# Patient Record
Sex: FEMALE | Race: WHITE | NOT HISPANIC OR LATINO | Employment: FULL TIME | URBAN - NONMETROPOLITAN AREA
[De-identification: names, ages, dates, MRNs, and addresses within clinical notes are randomized per-mention and may not be internally consistent; named-entity substitution may affect disease eponyms.]

---

## 2020-06-08 ENCOUNTER — TRANSCRIBE ORDERS (OUTPATIENT)
Dept: ADMINISTRATIVE | Facility: HOSPITAL | Age: 40
End: 2020-06-08

## 2020-06-08 DIAGNOSIS — Z01.818 PRE-OPERATIVE CLEARANCE: Primary | ICD-10-CM

## 2020-06-09 ENCOUNTER — LAB (OUTPATIENT)
Dept: LAB | Facility: HOSPITAL | Age: 40
End: 2020-06-09

## 2020-06-09 DIAGNOSIS — Z01.818 PRE-OPERATIVE CLEARANCE: ICD-10-CM

## 2020-06-09 LAB
REF LAB TEST METHOD: NORMAL
SARS-COV-2 RNA RESP QL NAA+PROBE: NOT DETECTED

## 2020-06-09 PROCEDURE — U0002 COVID-19 LAB TEST NON-CDC: HCPCS | Performed by: SURGERY

## 2020-06-09 PROCEDURE — C9803 HOPD COVID-19 SPEC COLLECT: HCPCS

## 2020-06-09 PROCEDURE — U0004 COV-19 TEST NON-CDC HGH THRU: HCPCS | Performed by: SURGERY

## 2020-09-29 ENCOUNTER — TRANSCRIBE ORDERS (OUTPATIENT)
Dept: ADMINISTRATIVE | Facility: HOSPITAL | Age: 40
End: 2020-09-29

## 2020-09-29 DIAGNOSIS — Z11.59 SCREENING EXAMINATION FOR POLIOMYELITIS: Primary | ICD-10-CM

## 2021-01-29 ENCOUNTER — HOSPITAL ENCOUNTER (OUTPATIENT)
Dept: HOSPITAL 79 - CATH | Age: 41
End: 2021-01-29
Attending: INTERNAL MEDICINE
Payer: COMMERCIAL

## 2021-01-29 DIAGNOSIS — R00.2: ICD-10-CM

## 2021-01-29 DIAGNOSIS — R11.2: ICD-10-CM

## 2021-01-29 DIAGNOSIS — R55: Primary | ICD-10-CM

## 2021-01-29 DIAGNOSIS — R51.9: ICD-10-CM

## 2021-01-29 DIAGNOSIS — R00.0: ICD-10-CM

## 2021-01-29 LAB — BUN/CREATININE RATIO: 14 (ref 0–10)

## 2021-02-09 ENCOUNTER — HOSPITAL ENCOUNTER (OUTPATIENT)
Dept: HOSPITAL 79 - HEART 5 | Age: 41
End: 2021-02-09
Attending: INTERNAL MEDICINE
Payer: COMMERCIAL

## 2021-02-09 DIAGNOSIS — R00.2: ICD-10-CM

## 2021-02-09 DIAGNOSIS — Z13.9: Primary | ICD-10-CM

## 2021-02-09 DIAGNOSIS — R55: ICD-10-CM

## 2021-02-09 DIAGNOSIS — R00.0: ICD-10-CM

## 2022-09-06 ENCOUNTER — HOSPITAL ENCOUNTER (OUTPATIENT)
Dept: HOSPITAL 79 - KOH-I | Age: 42
End: 2022-09-06
Payer: COMMERCIAL

## 2022-09-06 DIAGNOSIS — R20.2: Primary | ICD-10-CM

## 2022-09-06 DIAGNOSIS — J32.0: ICD-10-CM

## 2022-09-06 DIAGNOSIS — R51.9: ICD-10-CM

## 2025-06-25 ENCOUNTER — TRANSCRIBE ORDERS (OUTPATIENT)
Dept: ADMINISTRATIVE | Facility: HOSPITAL | Age: 45
End: 2025-06-25
Payer: COMMERCIAL

## 2025-06-25 DIAGNOSIS — Z12.31 SCREENING MAMMOGRAM FOR BREAST CANCER: Primary | ICD-10-CM

## 2025-06-30 ENCOUNTER — HOSPITAL ENCOUNTER (OUTPATIENT)
Facility: HOSPITAL | Age: 45
Discharge: HOME | End: 2025-06-30
Admitting: NURSE PRACTITIONER
Payer: COMMERCIAL

## 2025-06-30 DIAGNOSIS — Z12.31 SCREENING MAMMOGRAM FOR BREAST CANCER: ICD-10-CM

## 2025-06-30 PROCEDURE — 77063 BREAST TOMOSYNTHESIS BI: CPT

## 2025-06-30 PROCEDURE — 77067 SCR MAMMO BI INCL CAD: CPT

## 2025-07-24 ENCOUNTER — PRE-ADMISSION TESTING (OUTPATIENT)
Dept: PREADMISSION TESTING | Facility: HOSPITAL | Age: 45
End: 2025-07-24

## 2025-07-24 VITALS — BODY MASS INDEX: 28.64 KG/M2 | WEIGHT: 167.77 LBS | HEIGHT: 64 IN

## 2025-07-24 LAB
ANION GAP SERPL CALCULATED.3IONS-SCNC: 9.2 MMOL/L (ref 5–15)
BUN SERPL-MCNC: 6.4 MG/DL (ref 6–20)
BUN/CREAT SERPL: 9.3 (ref 7–25)
CALCIUM SPEC-SCNC: 9.4 MG/DL (ref 8.6–10.5)
CHLORIDE SERPL-SCNC: 105 MMOL/L (ref 98–107)
CO2 SERPL-SCNC: 24.8 MMOL/L (ref 22–29)
CREAT SERPL-MCNC: 0.69 MG/DL (ref 0.57–1)
DEPRECATED RDW RBC AUTO: 38.5 FL (ref 37–54)
EGFRCR SERPLBLD CKD-EPI 2021: 110.6 ML/MIN/1.73
ERYTHROCYTE [DISTWIDTH] IN BLOOD BY AUTOMATED COUNT: 11.5 % (ref 12.3–15.4)
GLUCOSE SERPL-MCNC: 99 MG/DL (ref 65–99)
HBA1C MFR BLD: 4.83 % (ref 4.8–5.6)
HCT VFR BLD AUTO: 41.2 % (ref 34–46.6)
HGB BLD-MCNC: 13.9 G/DL (ref 12–15.9)
MCH RBC QN AUTO: 30.7 PG (ref 26.6–33)
MCHC RBC AUTO-ENTMCNC: 33.7 G/DL (ref 31.5–35.7)
MCV RBC AUTO: 90.9 FL (ref 79–97)
PLATELET # BLD AUTO: 198 10*3/MM3 (ref 140–450)
PMV BLD AUTO: 10.3 FL (ref 6–12)
POTASSIUM SERPL-SCNC: 4.5 MMOL/L (ref 3.5–5.2)
RBC # BLD AUTO: 4.53 10*6/MM3 (ref 3.77–5.28)
SODIUM SERPL-SCNC: 139 MMOL/L (ref 136–145)
WBC NRBC COR # BLD AUTO: 7.26 10*3/MM3 (ref 3.4–10.8)

## 2025-07-24 PROCEDURE — 80048 BASIC METABOLIC PNL TOTAL CA: CPT

## 2025-07-24 PROCEDURE — 36415 COLL VENOUS BLD VENIPUNCTURE: CPT

## 2025-07-24 PROCEDURE — 85027 COMPLETE CBC AUTOMATED: CPT

## 2025-07-24 PROCEDURE — 83036 HEMOGLOBIN GLYCOSYLATED A1C: CPT

## 2025-07-24 NOTE — PAT
Patient viewed general PAT education video as instructed in their preoperative information received from their surgeon.  Patient stated the general PAT education video was viewed in its entirety and survey completed.  Copies of PAT general education handouts (Incentive Spirometry, Meds to Beds Program, Patient Belongings, Pre-op skin preparation instructions, Blood Glucose testing, Visitor policy, Surgery FAQ, Code H) distributed to patient if not printed. Education related to the PAT pass and skin preparation for surgery (if applicable) completed in PAT as a reinforcement to PAT education video. Patient instructed to return PAT pass provided today as well as completed skin preparation sheet (if applicable) on the day of procedure.     Additionally if patient had not viewed video yet but intended to view it at home or in our waiting area, then referred them to the handout with QR code/link provided during PAT visit.  Encouraged patient/family to read PAT general education handouts thoroughly and notify PAT staff with any questions or concerns. Patient verbalized understanding of all information and priority content.    An arrival time for procedure was not provided during PAT visit. If patient had any questions or concerns about their arrival time, they were instructed to contact their surgeon/physician.  Additionally, if the patient referred to an arrival time that was acquired from their my chart account, patient was encouraged to verify that time with their surgeon/physician. Arrival times are NOT provided in Pre Admission Testing Department.    Patient denies any current skin issues.     Post-Surgery Information Instruction Sheet given to patient during Pre-Admission Testing Visit with verbal instructions to patient to return with PAT PASS on the day of surgery. Additionally, encouraged patient to review the information provided.    Patient to apply Chlorhexadine wipes  to surgical area (as instructed) the night  before procedure and the AM of procedure. Wipes provided.    Patient instructed to drink 20 ounces of Gatorade or Gatorlyte (if diabetic) and it needs to be completed 1 hour (for Main OR patients) or 2 hours (scheduled  section & [facility] patients) before given arrival time for procedure (NO RED Gatorade and NO Gatorade Zero).    Patient verbalized understanding.    LAST DOSE OF ZEPBOUND ON 25. WILL HOLD PRIOR TO SURGERY

## 2025-07-29 ENCOUNTER — APPOINTMENT (OUTPATIENT)
Dept: MAMMOGRAPHY | Facility: HOSPITAL | Age: 45
End: 2025-07-29
Payer: COMMERCIAL

## 2025-07-29 ENCOUNTER — HOSPITAL ENCOUNTER (OUTPATIENT)
Dept: ULTRASOUND IMAGING | Facility: HOSPITAL | Age: 45
Discharge: HOME | End: 2025-07-29
Admitting: RADIOLOGY
Payer: COMMERCIAL

## 2025-07-29 DIAGNOSIS — R92.8 ABNORMAL MAMMOGRAM: ICD-10-CM

## 2025-07-29 PROCEDURE — 76642 ULTRASOUND BREAST LIMITED: CPT | Performed by: RADIOLOGY

## 2025-07-29 PROCEDURE — 76642 ULTRASOUND BREAST LIMITED: CPT

## 2025-07-30 ENCOUNTER — ANESTHESIA EVENT (OUTPATIENT)
Dept: PERIOP | Facility: HOSPITAL | Age: 45
End: 2025-07-30

## 2025-07-30 RX ORDER — SODIUM CHLORIDE 0.9 % (FLUSH) 0.9 %
10 SYRINGE (ML) INJECTION AS NEEDED
Status: CANCELLED | OUTPATIENT
Start: 2025-07-30

## 2025-07-30 RX ORDER — FAMOTIDINE 10 MG/ML
20 INJECTION, SOLUTION INTRAVENOUS ONCE
Status: CANCELLED | OUTPATIENT
Start: 2025-07-30 | End: 2025-07-30

## 2025-07-31 ENCOUNTER — ANESTHESIA (OUTPATIENT)
Dept: PERIOP | Facility: HOSPITAL | Age: 45
End: 2025-07-31

## 2025-07-31 ENCOUNTER — HOSPITAL ENCOUNTER (OUTPATIENT)
Facility: HOSPITAL | Age: 45
Discharge: HOME | End: 2025-08-01
Attending: PLASTIC SURGERY | Admitting: PLASTIC SURGERY

## 2025-07-31 DIAGNOSIS — Z41.9 ELECTIVE SURGERY: ICD-10-CM

## 2025-07-31 PROBLEM — Z41.1 ENCOUNTER FOR COSMETIC PROCEDURE: Status: ACTIVE | Noted: 2025-07-31

## 2025-07-31 LAB
B-HCG UR QL: NEGATIVE
EXPIRATION DATE: NORMAL
INTERNAL NEGATIVE CONTROL: NEGATIVE
INTERNAL POSITIVE CONTROL: POSITIVE
Lab: NORMAL

## 2025-07-31 PROCEDURE — 25010000002 FENTANYL CITRATE (PF) 100 MCG/2ML SOLUTION

## 2025-07-31 PROCEDURE — 25810000003 SODIUM CHLORIDE PER 500 ML: Performed by: PLASTIC SURGERY

## 2025-07-31 PROCEDURE — 25810000003 LACTATED RINGERS PER 1000 ML: Performed by: ANESTHESIOLOGY

## 2025-07-31 PROCEDURE — 25010000002 LIDOCAINE-EPINEPHRINE (PF) 1 %-1:200000 SOLUTION 10 ML VIAL: Performed by: PLASTIC SURGERY

## 2025-07-31 PROCEDURE — 25010000002 CEFOXITIN PER 1 G: Performed by: PLASTIC SURGERY

## 2025-07-31 PROCEDURE — 25010000002 SUGAMMADEX 200 MG/2ML SOLUTION

## 2025-07-31 PROCEDURE — 25010000002 LIDOCAINE PF 1% 1 % SOLUTION

## 2025-07-31 PROCEDURE — 25010000002 PROPOFOL 10 MG/ML EMULSION

## 2025-07-31 PROCEDURE — 81025 URINE PREGNANCY TEST: CPT | Performed by: ANESTHESIOLOGY

## 2025-07-31 PROCEDURE — 25010000002 CEFAZOLIN PER 500 MG: Performed by: PLASTIC SURGERY

## 2025-07-31 PROCEDURE — 25010000002 EPINEPHRINE PER 0.1 MG: Performed by: PLASTIC SURGERY

## 2025-07-31 PROCEDURE — 25010000002 MIDAZOLAM PER 1 MG

## 2025-07-31 PROCEDURE — 25010000002 HYDROMORPHONE 1 MG/ML SOLUTION

## 2025-07-31 PROCEDURE — 25010000002 LIDOCAINE 1 % SOLUTION 20 ML VIAL: Performed by: PLASTIC SURGERY

## 2025-07-31 PROCEDURE — 25010000002 LIDOCAINE PF 1% 1 % SOLUTION: Performed by: ANESTHESIOLOGY

## 2025-07-31 PROCEDURE — 25010000002 DEXAMETHASONE PER 1 MG

## 2025-07-31 PROCEDURE — 25010000002 CEFAZOLIN PER 500 MG

## 2025-07-31 PROCEDURE — 25010000002 ALBUMIN HUMAN 5% PER 50 ML

## 2025-07-31 PROCEDURE — 25010000002 GENTAMICIN PER 80 MG: Performed by: PLASTIC SURGERY

## 2025-07-31 PROCEDURE — 25010000002 ONDANSETRON PER 1 MG

## 2025-07-31 PROCEDURE — P9041 ALBUMIN (HUMAN),5%, 50ML: HCPCS

## 2025-07-31 PROCEDURE — 25010000002 BUPIVACAINE LIPOSOME 1.3 % SUSPENSION: Performed by: PLASTIC SURGERY

## 2025-07-31 PROCEDURE — 25810000003 SODIUM CHLORIDE 0.9 % SOLUTION: Performed by: PLASTIC SURGERY

## 2025-07-31 PROCEDURE — 25010000002 MORPHINE PER 10 MG: Performed by: PLASTIC SURGERY

## 2025-07-31 PROCEDURE — 88302 TISSUE EXAM BY PATHOLOGIST: CPT | Performed by: PLASTIC SURGERY

## 2025-07-31 RX ORDER — HYDRALAZINE HYDROCHLORIDE 20 MG/ML
5 INJECTION INTRAMUSCULAR; INTRAVENOUS
Status: DISCONTINUED | OUTPATIENT
Start: 2025-07-31 | End: 2025-07-31 | Stop reason: HOSPADM

## 2025-07-31 RX ORDER — NALOXONE HCL 0.4 MG/ML
0.4 VIAL (ML) INJECTION AS NEEDED
Status: DISCONTINUED | OUTPATIENT
Start: 2025-07-31 | End: 2025-07-31 | Stop reason: HOSPADM

## 2025-07-31 RX ORDER — SODIUM CHLORIDE, SODIUM LACTATE, POTASSIUM CHLORIDE, CALCIUM CHLORIDE 600; 310; 30; 20 MG/100ML; MG/100ML; MG/100ML; MG/100ML
9 INJECTION, SOLUTION INTRAVENOUS CONTINUOUS
Status: DISCONTINUED | OUTPATIENT
Start: 2025-08-01 | End: 2025-07-31

## 2025-07-31 RX ORDER — SODIUM CHLORIDE, SODIUM LACTATE, POTASSIUM CHLORIDE, AND CALCIUM CHLORIDE .6; .31; .03; .02 G/100ML; G/100ML; G/100ML; G/100ML
IRRIGANT IRRIGATION AS NEEDED
Status: DISCONTINUED | OUTPATIENT
Start: 2025-07-31 | End: 2025-07-31 | Stop reason: HOSPADM

## 2025-07-31 RX ORDER — HYDROMORPHONE HYDROCHLORIDE 1 MG/ML
0.5 INJECTION, SOLUTION INTRAMUSCULAR; INTRAVENOUS; SUBCUTANEOUS
Status: DISCONTINUED | OUTPATIENT
Start: 2025-07-31 | End: 2025-07-31 | Stop reason: HOSPADM

## 2025-07-31 RX ORDER — ACETAMINOPHEN 325 MG/1
650 TABLET ORAL EVERY 4 HOURS PRN
Status: DISCONTINUED | OUTPATIENT
Start: 2025-07-31 | End: 2025-08-01 | Stop reason: HOSPADM

## 2025-07-31 RX ORDER — CYCLOBENZAPRINE HCL 10 MG
10 TABLET ORAL 3 TIMES DAILY PRN
Status: DISCONTINUED | OUTPATIENT
Start: 2025-07-31 | End: 2025-08-01 | Stop reason: HOSPADM

## 2025-07-31 RX ORDER — PROMETHAZINE HYDROCHLORIDE 25 MG/1
25 SUPPOSITORY RECTAL ONCE AS NEEDED
Status: DISCONTINUED | OUTPATIENT
Start: 2025-07-31 | End: 2025-07-31 | Stop reason: HOSPADM

## 2025-07-31 RX ORDER — ONDANSETRON 2 MG/ML
4 INJECTION INTRAMUSCULAR; INTRAVENOUS ONCE AS NEEDED
Status: DISCONTINUED | OUTPATIENT
Start: 2025-07-31 | End: 2025-07-31 | Stop reason: HOSPADM

## 2025-07-31 RX ORDER — PROMETHAZINE HYDROCHLORIDE 25 MG/1
25 TABLET ORAL ONCE AS NEEDED
Status: DISCONTINUED | OUTPATIENT
Start: 2025-07-31 | End: 2025-07-31 | Stop reason: HOSPADM

## 2025-07-31 RX ORDER — DROPERIDOL 2.5 MG/ML
0.62 INJECTION, SOLUTION INTRAMUSCULAR; INTRAVENOUS ONCE AS NEEDED
Status: DISCONTINUED | OUTPATIENT
Start: 2025-07-31 | End: 2025-07-31 | Stop reason: HOSPADM

## 2025-07-31 RX ORDER — SODIUM CHLORIDE 0.9 % (FLUSH) 0.9 %
3-10 SYRINGE (ML) INJECTION AS NEEDED
Status: DISCONTINUED | OUTPATIENT
Start: 2025-07-31 | End: 2025-07-31 | Stop reason: HOSPADM

## 2025-07-31 RX ORDER — LIDOCAINE HYDROCHLORIDE 10 MG/ML
0.5 INJECTION, SOLUTION EPIDURAL; INFILTRATION; INTRACAUDAL; PERINEURAL ONCE AS NEEDED
Status: COMPLETED | OUTPATIENT
Start: 2025-07-31 | End: 2025-07-31

## 2025-07-31 RX ORDER — DOCUSATE SODIUM 100 MG/1
100 CAPSULE, LIQUID FILLED ORAL 2 TIMES DAILY
Status: DISCONTINUED | OUTPATIENT
Start: 2025-07-31 | End: 2025-08-01 | Stop reason: HOSPADM

## 2025-07-31 RX ORDER — LABETALOL HYDROCHLORIDE 5 MG/ML
5 INJECTION, SOLUTION INTRAVENOUS
Status: DISCONTINUED | OUTPATIENT
Start: 2025-07-31 | End: 2025-07-31 | Stop reason: HOSPADM

## 2025-07-31 RX ORDER — SODIUM CHLORIDE 0.9 % (FLUSH) 0.9 %
10 SYRINGE (ML) INJECTION EVERY 12 HOURS SCHEDULED
Status: DISCONTINUED | OUTPATIENT
Start: 2025-07-31 | End: 2025-07-31 | Stop reason: HOSPADM

## 2025-07-31 RX ORDER — SODIUM CHLORIDE 9 MG/ML
50 INJECTION, SOLUTION INTRAVENOUS CONTINUOUS
Status: DISCONTINUED | OUTPATIENT
Start: 2025-07-31 | End: 2025-08-01 | Stop reason: HOSPADM

## 2025-07-31 RX ORDER — PROPOFOL 10 MG/ML
VIAL (ML) INTRAVENOUS AS NEEDED
Status: DISCONTINUED | OUTPATIENT
Start: 2025-07-31 | End: 2025-07-31 | Stop reason: SURG

## 2025-07-31 RX ORDER — OXYCODONE HYDROCHLORIDE 5 MG/1
5 TABLET ORAL EVERY 4 HOURS PRN
Status: DISCONTINUED | OUTPATIENT
Start: 2025-07-31 | End: 2025-08-01 | Stop reason: HOSPADM

## 2025-07-31 RX ORDER — ONDANSETRON 2 MG/ML
INJECTION INTRAMUSCULAR; INTRAVENOUS AS NEEDED
Status: DISCONTINUED | OUTPATIENT
Start: 2025-07-31 | End: 2025-07-31 | Stop reason: SDUPTHER

## 2025-07-31 RX ORDER — FAMOTIDINE 20 MG/1
20 TABLET, FILM COATED ORAL ONCE
Status: COMPLETED | OUTPATIENT
Start: 2025-07-31 | End: 2025-07-31

## 2025-07-31 RX ORDER — ONDANSETRON 4 MG/1
4 TABLET, ORALLY DISINTEGRATING ORAL EVERY 6 HOURS PRN
Status: DISCONTINUED | OUTPATIENT
Start: 2025-07-31 | End: 2025-08-01 | Stop reason: HOSPADM

## 2025-07-31 RX ORDER — HYDROCODONE BITARTRATE AND ACETAMINOPHEN 5; 325 MG/1; MG/1
1 TABLET ORAL ONCE AS NEEDED
Status: DISCONTINUED | OUTPATIENT
Start: 2025-07-31 | End: 2025-07-31 | Stop reason: HOSPADM

## 2025-07-31 RX ORDER — ROCURONIUM BROMIDE 10 MG/ML
INJECTION, SOLUTION INTRAVENOUS AS NEEDED
Status: DISCONTINUED | OUTPATIENT
Start: 2025-07-31 | End: 2025-07-31 | Stop reason: SURG

## 2025-07-31 RX ORDER — CEFAZOLIN SODIUM 1 G/3ML
INJECTION, POWDER, FOR SOLUTION INTRAMUSCULAR; INTRAVENOUS AS NEEDED
Status: DISCONTINUED | OUTPATIENT
Start: 2025-07-31 | End: 2025-07-31 | Stop reason: SURG

## 2025-07-31 RX ORDER — IPRATROPIUM BROMIDE AND ALBUTEROL SULFATE 2.5; .5 MG/3ML; MG/3ML
3 SOLUTION RESPIRATORY (INHALATION) ONCE AS NEEDED
Status: DISCONTINUED | OUTPATIENT
Start: 2025-07-31 | End: 2025-07-31 | Stop reason: HOSPADM

## 2025-07-31 RX ORDER — PROMETHAZINE HYDROCHLORIDE 12.5 MG/1
12.5 TABLET ORAL EVERY 6 HOURS PRN
Status: DISCONTINUED | OUTPATIENT
Start: 2025-07-31 | End: 2025-08-01 | Stop reason: HOSPADM

## 2025-07-31 RX ORDER — DEXMEDETOMIDINE HYDROCHLORIDE 4 UG/ML
INJECTION, SOLUTION INTRAVENOUS AS NEEDED
Status: DISCONTINUED | OUTPATIENT
Start: 2025-07-31 | End: 2025-07-31 | Stop reason: SURG

## 2025-07-31 RX ORDER — HYDROCODONE BITARTRATE AND ACETAMINOPHEN 7.5; 325 MG/1; MG/1
1 TABLET ORAL EVERY 4 HOURS PRN
Status: DISCONTINUED | OUTPATIENT
Start: 2025-07-31 | End: 2025-07-31 | Stop reason: HOSPADM

## 2025-07-31 RX ORDER — DROPERIDOL 2.5 MG/ML
0.62 INJECTION, SOLUTION INTRAMUSCULAR; INTRAVENOUS
Status: DISCONTINUED | OUTPATIENT
Start: 2025-07-31 | End: 2025-07-31 | Stop reason: HOSPADM

## 2025-07-31 RX ORDER — SODIUM CHLORIDE 9 MG/ML
INJECTION, SOLUTION INTRAVENOUS AS NEEDED
Status: DISCONTINUED | OUTPATIENT
Start: 2025-07-31 | End: 2025-07-31 | Stop reason: HOSPADM

## 2025-07-31 RX ORDER — FENTANYL CITRATE 50 UG/ML
INJECTION, SOLUTION INTRAMUSCULAR; INTRAVENOUS AS NEEDED
Status: DISCONTINUED | OUTPATIENT
Start: 2025-07-31 | End: 2025-07-31 | Stop reason: SDUPTHER

## 2025-07-31 RX ORDER — BUPIVACAINE HCL/0.9 % NACL/PF 0.125 %
PLASTIC BAG, INJECTION (ML) EPIDURAL AS NEEDED
Status: DISCONTINUED | OUTPATIENT
Start: 2025-07-31 | End: 2025-07-31 | Stop reason: SURG

## 2025-07-31 RX ORDER — NALOXONE HCL 0.4 MG/ML
0.4 VIAL (ML) INJECTION
Status: DISCONTINUED | OUTPATIENT
Start: 2025-07-31 | End: 2025-08-01 | Stop reason: HOSPADM

## 2025-07-31 RX ORDER — SODIUM CHLORIDE 9 MG/ML
9 INJECTION, SOLUTION INTRAVENOUS AS NEEDED
Status: DISCONTINUED | OUTPATIENT
Start: 2025-07-31 | End: 2025-07-31 | Stop reason: HOSPADM

## 2025-07-31 RX ORDER — ONDANSETRON 2 MG/ML
4 INJECTION INTRAMUSCULAR; INTRAVENOUS EVERY 6 HOURS PRN
Status: DISCONTINUED | OUTPATIENT
Start: 2025-07-31 | End: 2025-08-01 | Stop reason: HOSPADM

## 2025-07-31 RX ORDER — SCOPOLAMINE 1 MG/3D
1 PATCH, EXTENDED RELEASE TRANSDERMAL ONCE
Status: DISCONTINUED | OUTPATIENT
Start: 2025-07-31 | End: 2025-07-31

## 2025-07-31 RX ORDER — TEMAZEPAM 15 MG/1
15 CAPSULE ORAL NIGHTLY PRN
Status: DISCONTINUED | OUTPATIENT
Start: 2025-07-31 | End: 2025-08-01 | Stop reason: HOSPADM

## 2025-07-31 RX ORDER — MIDAZOLAM HYDROCHLORIDE 1 MG/ML
1 INJECTION, SOLUTION INTRAMUSCULAR; INTRAVENOUS
Status: DISCONTINUED | OUTPATIENT
Start: 2025-07-31 | End: 2025-07-31 | Stop reason: HOSPADM

## 2025-07-31 RX ORDER — SODIUM CHLORIDE 0.9 % (FLUSH) 0.9 %
3 SYRINGE (ML) INJECTION EVERY 12 HOURS SCHEDULED
Status: DISCONTINUED | OUTPATIENT
Start: 2025-07-31 | End: 2025-07-31 | Stop reason: HOSPADM

## 2025-07-31 RX ORDER — DEXAMETHASONE SODIUM PHOSPHATE 4 MG/ML
INJECTION, SOLUTION INTRA-ARTICULAR; INTRALESIONAL; INTRAMUSCULAR; INTRAVENOUS; SOFT TISSUE AS NEEDED
Status: DISCONTINUED | OUTPATIENT
Start: 2025-07-31 | End: 2025-07-31 | Stop reason: SURG

## 2025-07-31 RX ORDER — ALBUMIN HUMAN 50 G/1000ML
SOLUTION INTRAVENOUS CONTINUOUS PRN
Status: DISCONTINUED | OUTPATIENT
Start: 2025-07-31 | End: 2025-07-31 | Stop reason: SURG

## 2025-07-31 RX ORDER — MIDAZOLAM HYDROCHLORIDE 1 MG/ML
INJECTION, SOLUTION INTRAMUSCULAR; INTRAVENOUS AS NEEDED
Status: DISCONTINUED | OUTPATIENT
Start: 2025-07-31 | End: 2025-07-31 | Stop reason: SURG

## 2025-07-31 RX ORDER — LIDOCAINE HYDROCHLORIDE 10 MG/ML
INJECTION, SOLUTION EPIDURAL; INFILTRATION; INTRACAUDAL; PERINEURAL AS NEEDED
Status: DISCONTINUED | OUTPATIENT
Start: 2025-07-31 | End: 2025-07-31 | Stop reason: SURG

## 2025-07-31 RX ORDER — SODIUM CHLORIDE, SODIUM LACTATE, POTASSIUM CHLORIDE, CALCIUM CHLORIDE 600; 310; 30; 20 MG/100ML; MG/100ML; MG/100ML; MG/100ML
9 INJECTION, SOLUTION INTRAVENOUS CONTINUOUS
Status: DISCONTINUED | OUTPATIENT
Start: 2025-07-31 | End: 2025-07-31

## 2025-07-31 RX ORDER — FENTANYL CITRATE 50 UG/ML
50 INJECTION, SOLUTION INTRAMUSCULAR; INTRAVENOUS
Status: DISCONTINUED | OUTPATIENT
Start: 2025-07-31 | End: 2025-07-31 | Stop reason: HOSPADM

## 2025-07-31 RX ADMIN — SODIUM CHLORIDE 2000 MG: 900 INJECTION INTRAVENOUS at 07:49

## 2025-07-31 RX ADMIN — ONDANSETRON 4 MG: 2 INJECTION, SOLUTION INTRAMUSCULAR; INTRAVENOUS at 13:33

## 2025-07-31 RX ADMIN — FAMOTIDINE 20 MG: 20 TABLET, FILM COATED ORAL at 06:41

## 2025-07-31 RX ADMIN — DOCUSATE SODIUM 100 MG: 100 CAPSULE, LIQUID FILLED ORAL at 22:34

## 2025-07-31 RX ADMIN — HYDROMORPHONE HYDROCHLORIDE 0.5 MG: 1 INJECTION, SOLUTION INTRAMUSCULAR; INTRAVENOUS; SUBCUTANEOUS at 15:19

## 2025-07-31 RX ADMIN — MORPHINE SULFATE 4 MG: 4 INJECTION, SOLUTION INTRAMUSCULAR; INTRAVENOUS at 20:18

## 2025-07-31 RX ADMIN — DEXMEDETOMIDINE HYDROCHLORIDE IN 0.9% SODIUM CHLORIDE 8 MCG: 4 INJECTION INTRAVENOUS at 11:46

## 2025-07-31 RX ADMIN — DEXMEDETOMIDINE HYDROCHLORIDE IN 0.9% SODIUM CHLORIDE 8 MCG: 4 INJECTION INTRAVENOUS at 08:36

## 2025-07-31 RX ADMIN — SODIUM CHLORIDE 50 ML/HR: 9 INJECTION, SOLUTION INTRAVENOUS at 16:25

## 2025-07-31 RX ADMIN — ROCURONIUM BROMIDE 20 MG: 10 INJECTION INTRAVENOUS at 12:15

## 2025-07-31 RX ADMIN — HYDROMORPHONE HYDROCHLORIDE 0.5 MG: 1 INJECTION, SOLUTION INTRAMUSCULAR; INTRAVENOUS; SUBCUTANEOUS at 14:53

## 2025-07-31 RX ADMIN — SUGAMMADEX 200 MG: 100 INJECTION, SOLUTION INTRAVENOUS at 13:54

## 2025-07-31 RX ADMIN — DEXMEDETOMIDINE HYDROCHLORIDE IN 0.9% SODIUM CHLORIDE 8 MCG: 4 INJECTION INTRAVENOUS at 09:02

## 2025-07-31 RX ADMIN — SODIUM CHLORIDE, POTASSIUM CHLORIDE, SODIUM LACTATE AND CALCIUM CHLORIDE: 600; 310; 30; 20 INJECTION, SOLUTION INTRAVENOUS at 08:48

## 2025-07-31 RX ADMIN — ROCURONIUM BROMIDE 50 MG: 10 INJECTION INTRAVENOUS at 07:46

## 2025-07-31 RX ADMIN — ROCURONIUM BROMIDE 10 MG: 10 INJECTION INTRAVENOUS at 11:37

## 2025-07-31 RX ADMIN — CEFAZOLIN 2000 MG: 2 INJECTION, POWDER, FOR SOLUTION INTRAMUSCULAR; INTRAVENOUS at 22:34

## 2025-07-31 RX ADMIN — ACETAMINOPHEN 650 MG: 325 TABLET ORAL at 22:34

## 2025-07-31 RX ADMIN — PROPOFOL 200 MG: 10 INJECTION, EMULSION INTRAVENOUS at 07:46

## 2025-07-31 RX ADMIN — MIDAZOLAM HYDROCHLORIDE 2 MG: 1 INJECTION, SOLUTION INTRAMUSCULAR; INTRAVENOUS at 07:40

## 2025-07-31 RX ADMIN — SODIUM CHLORIDE, POTASSIUM CHLORIDE, SODIUM LACTATE AND CALCIUM CHLORIDE 9 ML/HR: 600; 310; 30; 20 INJECTION, SOLUTION INTRAVENOUS at 06:41

## 2025-07-31 RX ADMIN — ALBUMIN (HUMAN): 12.5 INJECTION, SOLUTION INTRAVENOUS at 12:42

## 2025-07-31 RX ADMIN — ONDANSETRON 4 MG: 2 INJECTION, SOLUTION INTRAMUSCULAR; INTRAVENOUS at 07:51

## 2025-07-31 RX ADMIN — Medication 100 MCG: at 08:13

## 2025-07-31 RX ADMIN — Medication 100 MCG: at 12:40

## 2025-07-31 RX ADMIN — ROCURONIUM BROMIDE 20 MG: 10 INJECTION INTRAVENOUS at 10:30

## 2025-07-31 RX ADMIN — Medication 100 MCG: at 12:55

## 2025-07-31 RX ADMIN — DEXMEDETOMIDINE HYDROCHLORIDE IN 0.9% SODIUM CHLORIDE 8 MCG: 4 INJECTION INTRAVENOUS at 07:58

## 2025-07-31 RX ADMIN — SODIUM CHLORIDE, POTASSIUM CHLORIDE, SODIUM LACTATE AND CALCIUM CHLORIDE: 600; 310; 30; 20 INJECTION, SOLUTION INTRAVENOUS at 11:04

## 2025-07-31 RX ADMIN — OXYCODONE 5 MG: 5 TABLET ORAL at 22:34

## 2025-07-31 RX ADMIN — Medication 100 MCG: at 12:52

## 2025-07-31 RX ADMIN — SCOPOLAMINE 1 PATCH: 1.5 PATCH, EXTENDED RELEASE TRANSDERMAL at 06:43

## 2025-07-31 RX ADMIN — DEXAMETHASONE SODIUM PHOSPHATE 8 MG: 4 INJECTION, SOLUTION INTRA-ARTICULAR; INTRALESIONAL; INTRAMUSCULAR; INTRAVENOUS; SOFT TISSUE at 07:51

## 2025-07-31 RX ADMIN — SODIUM CHLORIDE 2000 MG: 900 INJECTION INTRAVENOUS at 13:36

## 2025-07-31 RX ADMIN — SODIUM CHLORIDE, POTASSIUM CHLORIDE, SODIUM LACTATE AND CALCIUM CHLORIDE: 600; 310; 30; 20 INJECTION, SOLUTION INTRAVENOUS at 13:38

## 2025-07-31 RX ADMIN — LIDOCAINE HYDROCHLORIDE 0.5 ML: 10 INJECTION, SOLUTION EPIDURAL; INFILTRATION; INTRACAUDAL; PERINEURAL at 06:41

## 2025-07-31 RX ADMIN — Medication 100 MCG: at 12:33

## 2025-07-31 RX ADMIN — FENTANYL CITRATE 100 MCG: 50 INJECTION, SOLUTION INTRAMUSCULAR; INTRAVENOUS at 07:46

## 2025-07-31 RX ADMIN — PROPOFOL 25 MCG/KG/MIN: 10 INJECTION, EMULSION INTRAVENOUS at 07:56

## 2025-07-31 RX ADMIN — CEFAZOLIN 2 G: 1 INJECTION, POWDER, FOR SOLUTION INTRAMUSCULAR; INTRAVENOUS at 10:46

## 2025-07-31 RX ADMIN — CYCLOBENZAPRINE 10 MG: 10 TABLET, FILM COATED ORAL at 20:18

## 2025-07-31 RX ADMIN — Medication 100 MCG: at 11:19

## 2025-07-31 RX ADMIN — Medication 100 MCG: at 13:19

## 2025-07-31 RX ADMIN — Medication 100 MCG: at 13:48

## 2025-07-31 RX ADMIN — ROCURONIUM BROMIDE 20 MG: 10 INJECTION INTRAVENOUS at 08:58

## 2025-07-31 RX ADMIN — LIDOCAINE HYDROCHLORIDE 50 MG: 10 INJECTION, SOLUTION EPIDURAL; INFILTRATION; INTRACAUDAL; PERINEURAL at 07:46

## 2025-07-31 RX ADMIN — OXYCODONE 5 MG: 5 TABLET ORAL at 18:10

## 2025-07-31 NOTE — H&P
Pre-Op H&P (See Recent Office Note Attached for Full H&P)    Chief complaint: Encounter for cosmetic surgery    HPI:      Patient is a 45 y.o. female who presents for planned surgical management above issues.  Prior medical and surgical history significant for postop nausea vomiting,  x 1, breast biopsy.  Denies any history of prior cosmetic surgery.  She anticipates an abdominoplasty with bilateral mastopexy with augmentation and implants with Dr. Kumar today.  Feels well today.  She denies any symptoms last in the office.  Denies use of blood thinners.    Review of Systems:  General ROS:  no fever, chills, rashes.  No change since last office visit.  No recent sick exposure  Cardiovascular ROS: no chest pain or dyspnea on exertion  Respiratory ROS: no cough, shortness of breath, or wheezing    No Known Allergies     Immunization History:   Influenza: None  Pneumococcal: N/A  Tetanus: Unknown    Meds:    No current facility-administered medications on file prior to encounter.     No current outpatient medications on file prior to encounter.       Vital Signs:  /62 (BP Location: Right arm, Patient Position: Lying)   Pulse 76   Temp 97.4 °F (36.3 °C) (Temporal)   Resp 15   SpO2 96%     Physical Exam:    CV:  S1S2 regular rate and rhythm, no murmur               Resp:  Clear to auscultation; respirations regular, even and unlabored    Results Review:     Lab Results   Component Value Date    WBC 7.26 2025    HGB 13.9 2025    HCT 41.2 2025    MCV 90.9 2025     2025    GLUCOSE 99 2025    BUN 6.4 2025    CREATININE 0.69 2025     2025    K 4.5 2025     2025    CO2 24.8 2025    CALCIUM 9.4 2025        I reviewed the patient's new clinical results.    Cancer Staging (if applicable)  Cancer Patient: __ yes _x_no __unknown; If yes, clinical stage T:__ N:__M:__, stage group or __N/A    Assessment/Plan:  Patient is  a pleasant 45-year-old lady who presents for cosmetic surgery    -OR today for abdominoplasty with bilateral mastopexy with augmentation and implants    Seth Aponte PA-C   7/31/2025   06:54 EDT

## 2025-07-31 NOTE — ANESTHESIA PROCEDURE NOTES
Airway  Reason: elective    Date/Time: 7/31/2025 7:49 AM  Airway not difficult    General Information and Staff    Patient location during procedure: OR  CRNA/CAA: Ivette Brink CRNA    Indications and Patient Condition  Indications for airway management: airway protection    Preoxygenated: yes  MILS not maintained throughout    Mask difficulty assessment: 1 - vent by mask    Final Airway Details    Final airway type: endotracheal airway      Successful airway: ETT  Cuffed: yes   Successful intubation technique: direct laryngoscopy  Adjuncts used in placement: intubating stylet  Endotracheal tube insertion site: oral  Blade: Ratna  Blade size: 3  ETT size (mm): 7.0  Cormack-Lehane Classification: grade I - full view of glottis  Placement verified by: chest auscultation and capnometry   Inital cuff pressure (cm H2O): 5  Measured from: lips  ETT/EBT  to lips (cm): 20  Number of attempts at approach: 1  Assessment: lips, teeth, and gum same as pre-op and atraumatic intubation    Additional Comments  Negative epigastric sounds, Breath sound equal bilaterally with symmetric chest rise and fall

## 2025-07-31 NOTE — BRIEF OP NOTE
ABDOMINOPLASTY, MASTOPEXY, BREAST AUGMENTATION  Progress Note    [place]  7/31/2025    Pre-op Diagnosis:   Z41.1       Post-Op Diagnosis Codes:     * Encounter for cosmetic procedure [Z41.1]    Procedure(s):      Procedure(s):  ABDOMINOPLASTY  BILATERAL MASTOPEXY  AUGMENTATION AND IMPLANTS              Surgeon(s):  Carlin Kumar MD    Anesthesia: General    Staff:   Circulator: Alden Hernandez RN; Domenic Sherman RN; Mayda Street RN; Jojo Mandel RN  Scrub Person: Milli Paiz; Diana Mckee  Assistant: Alessandro Delcid RNFA  Assistant: Alessandro Delcid RNFA    Estimated Blood Loss: minimal    Urine Voided: 800 mL    Specimens:                Specimens       ID Source Type Tests Collected By Collected At Frozen?    A Breast, Left Tissue TISSUE PATHOLOGY EXAM   Carlin Kumar MD 7/31/25 0961 No    Description: COSMETIC SURGERY    B Breast, Right Tissue TISSUE PATHOLOGY EXAM   Carlin Kumar MD 7/31/25 1039 No    Description: RIGHT BREAST    Comment: RIGHT BREAST    This specimen was not marked as sent.              Drains:   Closed/Suction Drain 1 Inferior;Midline Abdomen Bulb 15 Fr. (Active)       Urethral Catheter Straight-tip 16 Fr. (Active)       Findings: breast ptosis      Complications: none immediate    Assistant: Alessandro Delcid RNFA  was responsible for performing the following activities: Retraction, Suction, Irrigation, Suturing, Closing, and Placing Dressing and their skilled assistance was necessary for the success of this case.    Carlin Kumar MD     Date: 7/31/2025  Time: 13:51 EDT

## 2025-07-31 NOTE — ANESTHESIA POSTPROCEDURE EVALUATION
Patient: [place]    Procedure Summary       Date: 07/31/25 Room / Location:  [facility] /  [facility]    Anesthesia Start: 0739 Anesthesia Stop: 1409    Procedures:       ABDOMINOPLASTY (Abdomen)      BILATERAL MASTOPEXY (Bilateral: Breast)      AUGMENTATION AND IMPLANTS (Bilateral: Breast) Diagnosis:       Encounter for cosmetic procedure      (Z41.1)    Surgeons: Carlin Kumar MD Provider: Nghia Escamilla MD    Anesthesia Type: general ASA Status: 1            Anesthesia Type: general    Vitals  Vitals Value Taken Time   /74 07/31/25 14:30   Temp 96.7 °F (35.9 °C) 07/31/25 14:07   Pulse 75 07/31/25 14:37   Resp 14 07/31/25 14:30   SpO2 99 % 07/31/25 14:37   Vitals shown include unfiled device data.        Post Anesthesia Care and Evaluation    Patient location during evaluation: PACU  Patient participation: waiting for patient participation  Level of consciousness: sleepy but conscious  Pain management: adequate    Airway patency: patent  Anesthetic complications: No anesthetic complications  PONV Status: none  Cardiovascular status: hemodynamically stable and acceptable  Respiratory status: nonlabored ventilation, acceptable, nasal cannula and oral airway  Hydration status: acceptable

## 2025-07-31 NOTE — ANESTHESIA PREPROCEDURE EVALUATION
Anesthesia Evaluation     Patient summary reviewed and Nursing notes reviewed   history of anesthetic complications:  PONV  NPO Solid Status: > 8 hours  NPO Liquid Status: > 2 hours           Airway   Mallampati: I  TM distance: >3 FB  Neck ROM: full  No difficulty expected  Dental      Pulmonary    (-) asthma, shortness of breath, recent URI, sleep apnea, not a smoker, no home oxygen  Cardiovascular   Exercise tolerance: good (4-7 METS)    (-) hypertension, dysrhythmias, cardiac stents      Neuro/Psych  (-) seizures, CVA  GI/Hepatic/Renal/Endo    (-) no renal disease, diabetes, no thyroid disorder    Musculoskeletal     Abdominal    Substance History      OB/GYN          Other                          Anesthesia Plan    ASA 1     general     (Zepbound (last dose 3weeks ago))  intravenous induction     Anesthetic plan, risks, benefits, and alternatives have been provided, discussed and informed consent has been obtained with: patient.    Plan discussed with CRNA.        CODE STATUS:

## 2025-07-31 NOTE — OP NOTE
Preoperative diagnosis: 1.  Desire for breast enhancement  2.  Desire for truncal enhancement     Postoperative diagnosis: 1.  Desire for breast enhancement  2.  Desire for truncal enhancement     Surgeon: Carlin Kumar MD     Assistant: GELACIO Reed who was essential in completion of this procedure in a timely and efficient fashion by assisting with retraction, suctioning, suturing, and placement of dressing.     Anesthesia: General     Procedure: 1.  Bilateral mastopexy via a wise pattern type mammoplasty incision and a superior pedicle  2.  Abdominoplasty  3.  Bilateral augmentation mammoplasty with Sientra HSC smooth round low plus profile volume 225 cc on the left serial #[id number] and volume 235 cc in the right serial #[id number] into a partial submuscular plane  4. Liposuction trunk     Indication: The patient is a 55-year-old female who presents to the operating room today for the aforementioned procedures.  An extensive discussion was had between the patient myself and her breast radiologist regarding her recent mammography.  In summary, her breast radiologist recommended a ultrasound-guided biopsy of a breast lesion on her left side.  This was partly related to the fact that the patient had no prior films available for comparison.  Overall, the lesion appeared benign.  It was in an area that was not going to be invaded by the aforementioned procedures.  The breast radiologist felt confident that the lesion could be biopsied after the procedure and if the lesion was unable to be located at that time, a recommendation for repeat imaging would be recommended at 6 months.  The patient was made aware of this information at the time of her diagnostic ultrasound.  In my discussion with her, she was again made aware of this information and was very interested in proceeding.  She was also made aware that if the future biopsy was not benign and indeed cancer, the patient would require further surgery including but  not limited to mastectomy.  Also given this information, the patient was interested in proceeding with surgery.  She signed a waiver to this effect.  All techniques, potential complications, and typical postoperative course were discussed with the patient.  She indicated her understanding wishes to proceed.     Findings: 1.  Bilateral glandular ptosis  2.  Excess skin and subcutaneous tissue trunk     Description: The patient was taken from preoperative holding to the operating room after informed consent was signed and on the chart and placed under general anesthesia successfully.  Prior to induction of anesthesia, the patient received a prophylactic dose of antibiotics and had bilateral lower extremity sequential compression devices in place and operational.  She was placed supine on the operating room table.  She had the pillow placed beneath her knees.  Her arms were gently abducted to 90 degrees and she had ulnar nerve padding.  Her chest wall and abdomen were prepped and draped in the usual sterile fashion.  First, her circumvertical mammoplasty incision was completed and reinforced with a marking pen.  A superior pedicle was marked.  A neoareola was marked with a 42 mm cookie cutter.  Bilateral vertically oriented lower pole incisions were marked within the mammoplasty incision.  The breast was injected in the lower pole with a lidocaine epinephrine solution.  I commenced the breast surgery on the right side.  The vertically oriented incision was made with a 10 blade.  The subcutaneous tissue was dissected down to the level of the chest wall with electrocautery.  The lateral border the pectoralis major muscle was identified and a submuscular plane was developed.  My dissection was carried out in a medial and cephalad direction.  Next, the inferior origin was taken out of the 5 o'clock position.  Next, the dissection was carried out in the more cephalad and medial direction taken down the origin on the costal  cartilage.  Lastly, my dissection was carried out laterally very judiciously.  Hemostasis was achieved with electrocautery.  A sizer was placed into this pocket and filled with 250 cc of injectable normal saline.  The breast tissue was temporary closed with Vicryl suture.  Next, the planned mastopexy incision was tailor tacked with staples.  The patient was sat upright to check for the appropriateness of the  lift.  This was noted to be adequate.  The patient was sat back down supine on the operating table.  This sequence of the procedure was then repeated on the patient's left side.  The patient was sat upright to check for symmetry.  Tailor tacking was adjusted to achieve the ideal symmetry.  Once this was achieved, the tailor tacked staples were marked on each side.  Volumes were adjusted such that the final fill volume on the right was 235 cc and the final fill volume on the left was 225 cc.  The tailor tacked staples were then released and the marks were brought into continuity with 1 another on the left.  The temporary closure and size were taken down and removed respectively.  The pocket was irrigated with normal saline.  Hemostasis was achieved with electrocautery.  The pocket was then irrigated with an antibiotic and Betadine solution.  The breast tissue was tagged with 2-0 Vicryl pop-off sutures.  My gloves were changed.  The planned implant was then inserted in the breast pocket via Regalado funnel and oriented and seated properly.  The Vicryl ties were then tied down.  Next, all planned incisions were scored with a 10 blade.  A superior pedicle was de-epithelialized with a 10 blade.  Tissue was then resected within the mammoplasty incision.  A total of 189 g of tissue was harvested from the left.  Hemostasis was achieved with electrocautery after irrigating the breast with normal saline.  The nipple areolar complex was advanced the 12 o'clock position and inset with 3-0 Monocryl suture in a deep dermal  buried interrupted fashion.  The caudal base of the neoareola opening was inset in a similar fashion.  The medial and lateral pillars were then approximated with 2-0 Vicryl suture in a simple erupted fashion.  The remainder of the skin was then temporary closed with staples.  My attention then turned to the patient's right side.  This sequence of the procedure was carried out in a similar fashion.  Total weight of breast tissue on the right side was 152 g.  The patient was sat upright to check for symmetry which was confirmed. The patient was sat back down supine on the operating room table.  Simultaneously, the skin around the areola and along the vertical limb was closed with 3-0 Monocryl suture in a deep dermal buried interrupted fashion and 4-0 Monocryl suture in an intracuticular fashion.  My attention then turned to the abdomen.  Bilateral lower quadrant stab incisions were made with an 11 blade.  Tumescent solution consisting of 1 L of LR, 1 ampule of epinephrine, and 30 cc of 1% plain lidocaine were injected in the patient's anterior flank region.  Power assisted liposuction was undertaken in the anterior flank region.  A total of 300 cc of tumescent was injected on each side and a total of 350 cc of Lipo aspirate was harvested from each side.  Next, the lower abdominal wall incision was checked for symmetry with placement of a silk suture at the midline of the mons pubis and at the xiphoid.  The marks of the lower abdominal wall incision were adjusted accordingly.  The same incision was then injected with a lidocaine and epinephrine solution.  A 10 blade was used to incise her lower abdominal wall incision that had been marked preoperatively.  This durga was checked for symmetry.  The subcutaneous tissue was dissected with electrocautery.  This was carried down to the abdominal wall.  A skin flap was then developed dissecting just superficial to deep fascia.  My dissection stopped at the umbilicus.  The  umbilicus was incised with an 11 blade.  The lower abdominal wall skin flap was then split down the middle with a 10 blade and electrocautery.  The umbilicus was then gently dissected free from the surrounding tissue with electrocautery.  In the supraumbilical region, my dissection was tapered towards the midline up to the level of the xiphoid process.  Hemostasis was achieved with electrocautery.  The diastases was measured at 5 cm.  This was plicated with 0 Ethibond suture in a interrupted buried figure-of-eight fashion supraumbilically and infraumbilically.  This was reinforced with a running 0 nylon suture supraumbilically and infraumbilically.  Next 215 [place] Sudhir drains were placed in the subcutaneous space.  They were brought out the upper thigh region and secured with 3-0 nylon suture in the standard fashion.  The buried drains from the posterior back were brought out in a similar location and secured in a similar fashion.  The skin flap in the supraumbilical region was then plicated to the deep fascia with 2-0 Vicryl suture in horizontal mattress fashion.  Next, the patient was sat in the beachchair position.  An estimation for the amount of skin to be resected was made.  Also, the neoumbilical opening was also marked with bimanual palpation.  These marks was checked multiple times.  The bupivacaine mixture was then injected into these marks.  After waiting appropriate amount time for hemostasis, 10 blade was used to incise the lower abdominal wall skin flaps.  The total weight on the left was 575 g and total weight on the right was 586 g.  The niraj umbilicus was incised with an 11 blade.  The umbilicus was tagged with 3-0 Monocryl suture with 1 suture passed through deep fascia at the 12:00, 3:00, and 9 o'clock position.  This was brought out the neoumbilical opening.  The remainder of the skin flap was then plicated to the deep fascia with 2-0 Vicryl suture in horizontal mattress fashion.  The  superficial fascia was closed with 2-0 Vicryl suture in a simple interrupted fashion with one suture throw through the deep fascia.  The skin was closed with a dermal stapler and 3-0 Stratafix suture in intracuticular fashion.  The umbilicus was closed with 3-0 Monocryl suture in a deep dermal buried interrupted fashion and 5-0 Fast absorbintg suture in simple interrupted fashion.  Tissue glue was applied to all incisions.  The drains were dressed with a Biopatch, 4 x 4's, and tape.  The abdomen was dressed with ABD pads and abdominal binder.  The case was turned over to anesthesia which point patient was awoken from general anesthesia successfully taken to PACU in stable condition.  I was present for the entire procedure.  All counts were correct.     Estimated blood loss: 100 cc     Drains: 2     Complications: None immediate

## 2025-07-31 NOTE — PLAN OF CARE
Problem: Adult Inpatient Plan of Care  Goal: Plan of Care Review  Outcome: Progressing  Goal: Patient-Specific Goal (Individualized)  Outcome: Progressing  Goal: Absence of Hospital-Acquired Illness or Injury  Outcome: Progressing  Intervention: Identify and Manage Fall Risk  Recent Flowsheet Documentation  Taken 7/31/2025 1612 by Kristal Paiz RN  Safety Promotion/Fall Prevention:   activity supervised   assistive device/personal items within reach   clutter free environment maintained   fall prevention program maintained   lighting adjusted   gait belt   mobility aid in reach   muscle strengthening facilitated   nonskid shoes/slippers when out of bed   room organization consistent   safety round/check completed   toileting scheduled  Intervention: Prevent Skin Injury  Recent Flowsheet Documentation  Taken 7/31/2025 1612 by Kristal Paiz RN  Body Position: position maintained  Skin Protection:   incontinence pads utilized   transparent dressing maintained  Intervention: Prevent and Manage VTE (Venous Thromboembolism) Risk  Recent Flowsheet Documentation  Taken 7/31/2025 1612 by Kristal Paiz RN  VTE Prevention/Management:   bilateral   SCDs (sequential compression devices) on  Intervention: Prevent Infection  Recent Flowsheet Documentation  Taken 7/31/2025 1612 by Kristal Paiz RN  Infection Prevention:   cohorting utilized   environmental surveillance performed   equipment surfaces disinfected   hand hygiene promoted   rest/sleep promoted   single patient room provided  Goal: Optimal Comfort and Wellbeing  Outcome: Progressing  Intervention: Provide Person-Centered Care  Recent Flowsheet Documentation  Taken 7/31/2025 1612 by Kristal Paiz RN  Trust Relationship/Rapport:   care explained   choices provided   emotional support provided   questions encouraged   reassurance provided   empathic listening provided   questions answered   thoughts/feelings acknowledged  Goal: Readiness for Transition of Care  Outcome:  Progressing     Problem: Surgery Nonspecified  Goal: Absence of Bleeding  Outcome: Progressing  Intervention: Monitor and Manage Bleeding  Recent Flowsheet Documentation  Taken 7/31/2025 1612 by Kristal Paiz RN  Bleeding Management: dressing monitored  Goal: Effective Bowel Elimination  Outcome: Progressing  Goal: Fluid and Electrolyte Balance  Outcome: Progressing  Goal: Blood Glucose Level Within Target Range  Outcome: Progressing  Goal: Absence of Infection Signs and Symptoms  Outcome: Progressing  Goal: Anesthesia/Sedation Recovery  Outcome: Progressing  Intervention: Optimize Anesthesia Recovery  Recent Flowsheet Documentation  Taken 7/31/2025 1612 by Kristal Paiz RN  Safety Promotion/Fall Prevention:   activity supervised   assistive device/personal items within reach   clutter free environment maintained   fall prevention program maintained   lighting adjusted   gait belt   mobility aid in reach   muscle strengthening facilitated   nonskid shoes/slippers when out of bed   room organization consistent   safety round/check completed   toileting scheduled  Goal: Optimal Pain Control and Function  Outcome: Progressing  Intervention: Prevent or Manage Pain  Recent Flowsheet Documentation  Taken 7/31/2025 1612 by Kristal Paiz RN  Diversional Activities: television  Goal: Nausea and Vomiting Relief  Outcome: Progressing  Goal: Effective Urinary Elimination  Outcome: Progressing  Goal: Effective Oxygenation and Ventilation  Outcome: Progressing  Intervention: Optimize Oxygenation and Ventilation  Recent Flowsheet Documentation  Taken 7/31/2025 1612 by Kristal Paiz RN  Activity Management: activity encouraged  Head of Bed (HOB) Positioning: HOB elevated  Airway/Ventilation Management:   airway patency maintained   position adjusted   pulmonary hygiene promoted  Cough And Deep Breathing: done with encouragement   Goal Outcome Evaluation:

## 2025-08-01 VITALS
WEIGHT: 164 LBS | HEIGHT: 64 IN | SYSTOLIC BLOOD PRESSURE: 91 MMHG | DIASTOLIC BLOOD PRESSURE: 58 MMHG | HEART RATE: 74 BPM | BODY MASS INDEX: 28 KG/M2 | RESPIRATION RATE: 16 BRPM | OXYGEN SATURATION: 97 % | TEMPERATURE: 98 F

## 2025-08-01 PROCEDURE — 25010000002 CEFAZOLIN PER 500 MG: Performed by: PLASTIC SURGERY

## 2025-08-01 RX ADMIN — ACETAMINOPHEN 650 MG: 325 TABLET ORAL at 02:50

## 2025-08-01 RX ADMIN — CEFAZOLIN 2000 MG: 2 INJECTION, POWDER, FOR SOLUTION INTRAMUSCULAR; INTRAVENOUS at 05:38

## 2025-08-01 RX ADMIN — ACETAMINOPHEN 650 MG: 325 TABLET ORAL at 08:07

## 2025-08-01 RX ADMIN — CYCLOBENZAPRINE 10 MG: 10 TABLET, FILM COATED ORAL at 06:49

## 2025-08-01 RX ADMIN — OXYCODONE 5 MG: 5 TABLET ORAL at 06:49

## 2025-08-01 RX ADMIN — OXYCODONE 5 MG: 5 TABLET ORAL at 02:49

## 2025-08-01 RX ADMIN — OXYCODONE 5 MG: 5 TABLET ORAL at 10:47

## 2025-08-01 RX ADMIN — DOCUSATE SODIUM 100 MG: 100 CAPSULE, LIQUID FILLED ORAL at 08:07

## 2025-08-01 NOTE — DISCHARGE SUMMARY
Date of admission: 7/31/2025    Date of discharge: 8/1/2025    Discharging physician: Carlin Kumar MD    Discharge diagnosis: 1.  Encounter for cosmetic surgery    Operations: 1.  Bilateral mastopexy  2.  Bilateral augmentation mammoplasty  3.  Abdominoplasty with flank liposuction    Hospital course: The patient is a 45-year-old female who underwent the aforementioned procedure on 7/31/2025.  Her postoperative stay was uncomplicated.  She remained afebrile and vitally stable.  On postoperative day #1, the patient's vital signs were within normal limits.  Her surgical sites were within normal limits.  She was tolerating p.o., able to ambulate, and her pain was controlled with p.o. pain medicine.  She was therefore deemed ready for discharge.    Plan disposition: Discharge to home    Diet: Light regular diet    Activity: Light activity    Medications: See medicine reconciliation    Follow-up: Has been arranged

## 2025-08-01 NOTE — NURSING NOTE
Patient approved for discharge per MD order. IV removed intact without issue. Patient remains on RA with VSS, voiding appropriately, bowel sounds active x4, tolerating diet and ambulating appropriately. RN completed discharge instructions including medications prescribed, follow up appointments, activity restrictions, and infection prevention. Patient educated on JEAN PAUL drain care including how to empty the drain, how to strip the drain, and how to care for the insertion site. Patient demonstrated appropriate use of JEAN PAUL drain, verbalized understanding and denies and further questions at this time. Alcohol wipes, split gauze, tape, specimen cups, drain log, abdominal pads, and abdominal binder all sent with patient for discharge supplies.

## 2025-08-01 NOTE — PLAN OF CARE
Goal Outcome Evaluation:  Plan of Care Reviewed With: patient        Progress: improving  Outcome Evaluation: VSS on RA. A&O x4. Pleasant. Breast and abdominal incisions dry and intact. Stable dried drainage noted. Bilateral JEAN PAUL drains present. Drain care provided. Drains producing bloody drainage L>R. Ambulating to bathroom and in halls with x1 assist. PRN morphine x1, oxy x2, flexeril x1, and tylenol x2. Sleeping briefly between care. Voiding. Adequate UOP. Good PO intake. Plan to d/c home in AM.

## 2025-08-01 NOTE — CASE MANAGEMENT/SOCIAL WORK
Case Management Discharge Note      Final Note: Home         Selected Continued Care - Admitted Since 7/31/2025       Destination    No services have been selected for the patient.                Durable Medical Equipment    No services have been selected for the patient.                Dialysis/Infusion    No services have been selected for the patient.                Home Medical Care    No services have been selected for the patient.                Therapy    No services have been selected for the patient.                Community Resources    No services have been selected for the patient.                Community & DME    No services have been selected for the patient.                         Final Discharge Disposition Code: 01 - home or self-care

## 2025-08-04 LAB
CYTO UR: NORMAL
LAB AP CASE REPORT: NORMAL
LAB AP CLINICAL INFORMATION: NORMAL
PATH REPORT.FINAL DX SPEC: NORMAL
PATH REPORT.GROSS SPEC: NORMAL